# Patient Record
Sex: FEMALE | URBAN - METROPOLITAN AREA
[De-identification: names, ages, dates, MRNs, and addresses within clinical notes are randomized per-mention and may not be internally consistent; named-entity substitution may affect disease eponyms.]

---

## 2020-07-18 ENCOUNTER — NURSE TRIAGE (OUTPATIENT)
Dept: ADMINISTRATIVE | Facility: CLINIC | Age: 70
End: 2020-07-18

## 2020-07-18 NOTE — TELEPHONE ENCOUNTER
attempt x 1 LVM, attempt x 2, NA    Reason for Disposition   Message left on unidentified voice mail.  Phone number verified.    Additional Information   Negative: Caller has already spoken with the PCP and has no further questions.   Negative: Caller has already spoken with another triager and has no further questions.   Negative: Caller has already spoken with another triager or PCP AND has further questions AND triager able to answer questions.   Negative: Caller is angry or rude (e.g., hangs up, verbally abusive, yelling)   Negative: Caller hangs up   Negative: Message left on identified voice mail   Negative: No answer.  First attempt to contact caller.  Follow-up call scheduled within 15 minutes.   Negative: Busy signal.  First attempt to contact caller.  Follow-up call scheduled within 15 minutes.    Protocols used: NO CONTACT OR DUPLICATE CONTACT CALL-A-